# Patient Record
Sex: FEMALE | URBAN - METROPOLITAN AREA
[De-identification: names, ages, dates, MRNs, and addresses within clinical notes are randomized per-mention and may not be internally consistent; named-entity substitution may affect disease eponyms.]

---

## 2018-08-13 DIAGNOSIS — K91.2 POSTOPERATIVE MALABSORPTION: Primary | ICD-10-CM

## 2018-08-13 DIAGNOSIS — R79.89 LOW VITAMIN D LEVEL: ICD-10-CM

## 2018-08-16 ENCOUNTER — OFFICE VISIT (OUTPATIENT)
Dept: SURGERY | Age: 53
End: 2018-08-16

## 2018-08-16 VITALS
HEART RATE: 91 BPM | OXYGEN SATURATION: 99 % | SYSTOLIC BLOOD PRESSURE: 150 MMHG | HEIGHT: 62 IN | WEIGHT: 201 LBS | TEMPERATURE: 98.2 F | DIASTOLIC BLOOD PRESSURE: 92 MMHG | BODY MASS INDEX: 36.99 KG/M2

## 2018-08-16 DIAGNOSIS — E03.9 HYPOTHYROIDISM, UNSPECIFIED TYPE: ICD-10-CM

## 2018-08-16 DIAGNOSIS — E66.01 MORBID OBESITY (HCC): Primary | ICD-10-CM

## 2018-08-16 RX ORDER — HYDROGEN PEROXIDE 3 %
20 SOLUTION, NON-ORAL MISCELLANEOUS DAILY
Qty: 90 CAP | Refills: 3 | Status: SHIPPED | OUTPATIENT
Start: 2018-08-16

## 2018-08-16 RX ORDER — SUCRALFATE 1 G/1
1 TABLET ORAL 4 TIMES DAILY
Qty: 120 TAB | Refills: 2 | Status: SHIPPED | OUTPATIENT
Start: 2018-08-16 | End: 2018-11-14

## 2018-08-16 RX ORDER — LEVOTHYROXINE SODIUM 25 UG/1
TABLET ORAL
COMMUNITY

## 2018-08-16 NOTE — PROGRESS NOTES
1. Have you been to the ER, urgent care clinic since your last visit? Hospitalized since your last visit? Yes When: August 2018, abdominal pain    2. Have you seen or consulted any other health care providers outside of the [address] [place] [address] since your last visit? Include any pap smears or colon screening.  No

## 2018-08-16 NOTE — MR AVS SNAPSHOT
[address] [facility], [place] [address] 200 [facility] 
[phone] Patient: Alex Main MRN: [id number] D:[id number] Visit Information Date & Time Provider Department Dept. Phone Encounter #  
 8/16/2018 10:30 AM July Ash MD [facility] [phone] Your Appointments 11/23/2018 10:00 AM  
GASTRIC BYPASS VISIT with July Ash MD  
[facility] Appt Note: GBP follow up 3 months 1212 [facility] [id number] [address] 851 [facility] 200 [facility] Upcoming Health Maintenance Date Due Hepatitis C Screening 1965 DTaP/Tdap/Td series (1 - Tdap) 3/30/1986 PAP AKA CERVICAL CYTOLOGY 3/30/1986 BREAST CANCER SCRN MAMMOGRAM 3/30/2015 FOBT Q 1 YEAR AGE 50-75 3/30/2015 Influenza Age 5 to Adult 8/1/2018 Allergies as of 8/16/2018  Review Complete On: 8/16/2018 By: Charles Ramirez Severity Noted Reaction Type Reactions Erythromycin    Unable to Obtain Current Immunizations  Never Reviewed No immunizations on file. Not reviewed this visit You Were Diagnosed With   
  
 Codes Comments Morbid obesity ([facility] [address]    -  Primary ICD-10-CM: E66.01 
ICD-9-CM: 278.01   
 BMI 36.0-36.9,adult     ICD-10-CM: I17.48 
ICD-9-CM: V85.36 Hypothyroidism, unspecified type     ICD-10-CM: E03.9 ICD-9-CM: 106. 9 Vitals BP Pulse Temp Height(growth percentile) Weight(growth percentile) SpO2  
 (!) 150/92 91 98.2 °F (36.8 °C) 5' 2\" (1.575 m) 201 lb (91.2 kg) 99% BMI OB Status Smoking Status 36.76 kg/m2 Menopause Never Smoker Vitals History BMI and BSA Data Body Mass Index Body Surface Area  
 36.76 kg/m 2 2 m 2 Your Updated Medication List  
  
   
 This list is accurate as of 8/16/18 11:04 AM.  Always use your most recent med list.  
  
  
  
  
 CALCIUM PO Take  by mouth two (2) times a day. esomeprazole 20 mg capsule Commonly known as:  NexIUM 24HR Take 1 Cap by mouth daily. Indications: PREVENTION OF NSAID-INDUCED GASTRIC ULCER  
  
 levothyroxine 25 mcg tablet Commonly known as:  SYNTHROID Take  by mouth Daily (before breakfast). MULTIVITAMIN PO Take  by mouth two (2) times a day. sucralfate 1 gram tablet Commonly known as:  Rainell Ruder Take 1 Tab by mouth four (4) times daily for 90 days. Indications: Gastric Ulcer VITAMIN B-12 PO Take  by mouth every other day. Prescriptions Printed Refills  
 esomeprazole (NEXIUM 24HR) 20 mg capsule 3 Sig: Take 1 Cap by mouth daily. Indications: PREVENTION OF NSAID-INDUCED GASTRIC ULCER Class: Print Route: Oral  
 sucralfate (CARAFATE) 1 gram tablet 2 Sig: Take 1 Tab by mouth four (4) times daily for 90 days. Indications: Gastric Ulcer Class: Print Route: Oral  
  
Introducing [facility]! Aretha Alfonso introduces [employer] patient portal. Now you can access parts of your medical record, email your doctor's office, and request medication refills online. 1. In your internet browser, go to [email or web address] 2. Click on the First Time User? Click Here link in the Sign In box. You will see the New Member Sign Up page. 3. Enter your [employer] Access Code exactly as it appears below. You will not need to use this code after youve completed the sign-up process. If you do not sign up before the expiration date, you must request a new code. · [employer] Access Code: [email or web address] Expires: 11/14/2018 10:07 AM 
 
4. Enter the last four digits of your Social Security Number (xxxx) and Date of Birth (mm/dd/yyyy) as indicated and click Submit. You will be taken to the next sign-up page. 5. Create a [employer] ID. This will be your [employer] login ID and cannot be changed, so think of one that is secure and easy to remember. 6. Create a [employer] password. You can change your password at any time. 7. Enter your Password Reset Question and Answer. This can be used at a later time if you forget your password. 8. Enter your e-mail address. You will receive e-mail notification when new information is available in [address] 9. Click Sign Up. You can now view and download portions of your medical record. 10. Click the Download Summary menu link to download a portable copy of your medical information. If you have questions, please visit the Frequently Asked Questions section of the [employer] website. Remember, [employer] is NOT to be used for urgent needs. For medical emergencies, dial 911. Now available from your iPhone and Android! Please provide this summary of care documentation to your next provider. Your primary care clinician is listed as NONE. If you have any questions after today's visit, please call [phone].

## 2018-08-16 NOTE — PROGRESS NOTES
Subjective:      Ron Soto is a 48 y.o. female is now 10 years status post laparoscopic gastric bypass surgery. Currently on a stage 4 diet with difficulty. Taking in 64oz water,  60 g protein. 30 min of activity daily. Bowel movements are regular. The patient is not having any pain. .  The patient is not compliant with multivitamins, calcium and B12 supplements. She had surgery by me 11/2008.  has not seen me since 11/2009. Her pre-op weight was 245 lbs. Her annie was 156 lbs. She denies NSAIDs and tobacco. She is having epigastric pain after meals. She was seen in the ER last week for this and the CT was normal.            Weight Loss Metrics 8/16/2018 10/4/2010   Today's Wt 201 lb 156 lb   BMI 36.76 kg/m2 28.53 kg/m2       Body mass index is 36.76 kg/(m^2). Comorbidities:    Hypertension: not applicable  Diabetes: resolved  Obstructive Sleep Apnea: not applicable  Hyperlipidemia: not applicable  Stress Urinary Incontinence: not applicable  Gastroesophageal Reflux: not applicable  Weight related arthropathy:not applicable        Past Medical History:   Diagnosis Date    Asthma     Borderline diabetes mellitus     Obesity, morbid ([facility] [address] Nov 2008    Clinically severe obesity with BMI of 45 and comorbidities of AODM, WRA, GERD, and PAVEL. Past Surgical History:   Procedure Laterality Date    BIOPSY LIVER  11/18/2008    lap left hepatic wedge biopsy    HX APPENDECTOMY      HX BREAST REDUCTION  1997    HX CHOLECYSTECTOMY      HX GASTRIC BYPASS  11/18/2008    Lap Rochelle-en-Y       Current Outpatient Prescriptions   Medication Sig Dispense Refill    levothyroxine (SYNTHROID) 25 mcg tablet Take  by mouth Daily (before breakfast).  CALCIUM PO Take  by mouth two (2) times a day.  CYANOCOBALAMIN, VITAMIN B-12, (VITAMIN B-12 PO) Take  by mouth every other day.  MULTIVITAMIN PO Take  by mouth two (2) times a day.          Allergies   Allergen Reactions    Erythromycin Unable to Obtain         Objective:     Visit Vitals    BP (!) 150/92    Pulse 91    Temp 98.2 °F (36.8 °C)    Ht 5' 2\" (1.575 m)    Wt 91.2 kg (201 lb)    SpO2 99%    BMI 36.76 kg/m2       General:  alert, cooperative, no distress, appears stated age   Chest: no accessory muscle use   Cor:   Regular rate and rhythm   Abdomen: soft, bowel sounds active, mild epigastric pain   Incision:   all healed   Skin - no lesions  Gen - morbidly obese    Labs: hypovit D    Assessment:     Doing well postoperatively. Plan:     Resume vit D and calcium and MVI. See GI for EGD. Start PPI and carafate.  If EGD normal and still with pain, lap for internal hernia eval.   Follow up in 3 months

## 2018-11-20 ENCOUNTER — TELEPHONE (OUTPATIENT)
Dept: SURGERY | Age: 53
End: 2018-11-20